# Patient Record
Sex: MALE | Race: WHITE | NOT HISPANIC OR LATINO | Employment: OTHER | URBAN - METROPOLITAN AREA
[De-identification: names, ages, dates, MRNs, and addresses within clinical notes are randomized per-mention and may not be internally consistent; named-entity substitution may affect disease eponyms.]

---

## 2021-04-14 DIAGNOSIS — Z23 ENCOUNTER FOR IMMUNIZATION: ICD-10-CM

## 2022-09-09 ENCOUNTER — HOSPITAL ENCOUNTER (EMERGENCY)
Facility: HOSPITAL | Age: 77
Discharge: HOME | End: 2022-09-09
Attending: EMERGENCY MEDICINE
Payer: MEDICARE

## 2022-09-09 VITALS
HEART RATE: 58 BPM | SYSTOLIC BLOOD PRESSURE: 160 MMHG | TEMPERATURE: 97.3 F | RESPIRATION RATE: 18 BRPM | DIASTOLIC BLOOD PRESSURE: 92 MMHG | OXYGEN SATURATION: 94 %

## 2022-09-09 DIAGNOSIS — Z01.89 ROUTINE LAB DRAW: Primary | ICD-10-CM

## 2022-09-09 RX ORDER — TRIAMCINOLONE ACETONIDE 1 MG/G
CREAM TOPICAL
COMMUNITY
Start: 2022-02-04

## 2022-09-09 RX ORDER — RIVAROXABAN 20 MG/1
TABLET, FILM COATED ORAL
COMMUNITY
Start: 2022-08-31

## 2022-09-09 RX ORDER — ATORVASTATIN CALCIUM 40 MG/1
40 TABLET, FILM COATED ORAL
COMMUNITY
Start: 2022-06-20

## 2022-09-09 RX ORDER — CELECOXIB 200 MG/1
CAPSULE ORAL
COMMUNITY
Start: 2022-09-06

## 2022-09-09 RX ORDER — SILDENAFIL 100 MG/1
1 TABLET, FILM COATED ORAL DAILY PRN
COMMUNITY
Start: 2022-06-20

## 2022-09-09 RX ORDER — BRIMONIDINE TARTRATE AND TIMOLOL MALEATE 2; 5 MG/ML; MG/ML
SOLUTION OPHTHALMIC
COMMUNITY
Start: 2022-08-18

## 2022-09-09 RX ORDER — METFORMIN HYDROCHLORIDE 1000 MG/1
1 TABLET ORAL 2 TIMES DAILY WITH MEALS
COMMUNITY
Start: 2022-08-08

## 2022-09-09 RX ORDER — ALLOPURINOL 300 MG/1
300 TABLET ORAL
COMMUNITY
Start: 2022-06-20

## 2022-09-09 RX ORDER — METOPROLOL SUCCINATE 50 MG/1
50 TABLET, EXTENDED RELEASE ORAL
COMMUNITY
Start: 2022-07-23

## 2022-09-09 RX ORDER — FINASTERIDE 1 MG/1
1 TABLET, FILM COATED ORAL
COMMUNITY
Start: 2022-06-02

## 2022-09-09 RX ORDER — OMEPRAZOLE 20 MG/1
20 CAPSULE, DELAYED RELEASE ORAL DAILY
COMMUNITY
Start: 2021-11-12

## 2022-09-10 NOTE — DISCHARGE INSTRUCTIONS
You were brought to the hospital for blood work and evidence collection. We offered you a medical exam but you would like to refuse. We feel you are competent to make this choice.

## 2022-09-10 NOTE — ED ATTESTATION NOTE
The patient was evaluated and managed by the PA/NP/resident.  I have discussed the case with the PA/NP/resident.  I am in agreement with the ED workup and treatment plan.  I will continue to be available for consultation as needed.     Godshall, Duane K, MD  09/09/22 2026

## 2023-05-17 ENCOUNTER — APPOINTMENT (EMERGENCY)
Dept: RADIOLOGY | Facility: HOSPITAL | Age: 78
End: 2023-05-17
Payer: MEDICARE

## 2023-05-17 ENCOUNTER — HOSPITAL ENCOUNTER (EMERGENCY)
Facility: HOSPITAL | Age: 78
Discharge: HOME | End: 2023-05-17
Attending: EMERGENCY MEDICINE
Payer: MEDICARE

## 2023-05-17 VITALS
TEMPERATURE: 97.2 F | HEIGHT: 72 IN | WEIGHT: 210 LBS | DIASTOLIC BLOOD PRESSURE: 83 MMHG | HEART RATE: 50 BPM | OXYGEN SATURATION: 95 % | BODY MASS INDEX: 28.44 KG/M2 | SYSTOLIC BLOOD PRESSURE: 111 MMHG | RESPIRATION RATE: 16 BRPM

## 2023-05-17 DIAGNOSIS — S09.90XA CLOSED HEAD INJURY, INITIAL ENCOUNTER: Primary | ICD-10-CM

## 2023-05-17 DIAGNOSIS — S96.912A MUSCLE STRAIN OF LEFT FOOT, INITIAL ENCOUNTER: ICD-10-CM

## 2023-05-17 PROCEDURE — 99284 EMERGENCY DEPT VISIT MOD MDM: CPT | Mod: 25

## 2023-05-17 PROCEDURE — 73630 X-RAY EXAM OF FOOT: CPT | Mod: LT

## 2023-05-17 PROCEDURE — 70450 CT HEAD/BRAIN W/O DYE: CPT | Mod: ME

## 2023-05-17 ASSESSMENT — ENCOUNTER SYMPTOMS
FACIAL ASYMMETRY: 0
LIGHT-HEADEDNESS: 0
ABDOMINAL PAIN: 0
HEADACHES: 1
NAUSEA: 0
NECK PAIN: 0
VOMITING: 0
NUMBNESS: 0
WEAKNESS: 0
DIZZINESS: 0
FEVER: 0
SPEECH DIFFICULTY: 0
BACK PAIN: 0

## 2023-05-17 NOTE — ED PROVIDER NOTES
Emergency Medicine Note  HPI   HISTORY OF PRESENT ILLNESS     77 year old male with a history of hypertension, hyperlpidemia, diabetes, atrial fibrillation on xarelto presents to the ER for evaluation after a fall. Pt tripped yesterday while leaving a store and fell forward striking his head. He has bruise to the right side of his head which is sore. He did not lose consciousness. He feels ok otherwise. He denies any dizziness, nausea, vomiting, ataxia, speech or vision issue. He feels his left foot is more flat than it normally is. He denies any neck, back, chest, abdominal, hip injury. His upper extremities are without pain             Patient History   PAST HISTORY     Reviewed from Nursing Triage:       Past Medical History:   Diagnosis Date   • Hypertension    • Lipid disorder    • Type 2 diabetes mellitus (CMS/HCC)        Past Surgical History:   Procedure Laterality Date   • CARDIAC SURGERY         History reviewed. No pertinent family history.    Social History     Tobacco Use   • Smoking status: Never   • Smokeless tobacco: Never   Vaping Use   • Vaping status: Never Used   Substance Use Topics   • Alcohol use: Yes   • Drug use: Never         Review of Systems   REVIEW OF SYSTEMS     Review of Systems   Constitutional: Negative for fever.   Cardiovascular: Negative for chest pain.   Gastrointestinal: Negative for abdominal pain, nausea and vomiting.   Musculoskeletal: Negative for back pain, gait problem and neck pain.   Neurological: Positive for headaches. Negative for dizziness, facial asymmetry, speech difficulty, weakness, light-headedness and numbness.         VITALS     ED Vitals    Date/Time Temp Pulse Resp BP SpO2 [facility]   05/17/23 1249 36.2 °C (97.2 °F) 50 16 111/83 95 % JBK        Pulse Ox %: 95 % (05/17/23 1414)  Pulse Ox Interpretation: Normal (05/17/23 1414)           Physical Exam   PHYSICAL EXAM     Physical Exam  Constitutional:       General: He is not in acute distress.     Appearance:  Normal appearance. He is normal weight. He is not ill-appearing, toxic-appearing or diaphoretic.   HENT:      Head: Normocephalic.      Comments: Contusion to right temple  Eyes:      Extraocular Movements: Extraocular movements intact.      Pupils: Pupils are equal, round, and reactive to light.   Pulmonary:      Effort: Pulmonary effort is normal.   Chest:      Chest wall: No tenderness.   Abdominal:      Palpations: Abdomen is soft.      Tenderness: There is no abdominal tenderness.   Musculoskeletal:      Cervical back: Normal range of motion and neck supple. No tenderness.      Comments: No C/T/L spine tenderness.  Full ROM Of all 4 extremities. No swelling or deformities.    Skin:     General: Skin is warm and dry.   Neurological:      General: No focal deficit present.      Mental Status: He is alert and oriented to person, place, and time.      Sensory: No sensory deficit.      Motor: No weakness.           PROCEDURES     Procedures     DATA     Results     None          Imaging Results          CT HEAD WITHOUT IV CONTRAST (Final result)  Result time 05/17/23 14:06:10    Final result                 Impression:    IMPRESSION:  1. No acute intracranial abnormality.             Narrative:    CLINICAL HISTORY: Trauma. Fall. Head strike on concrete.    COMMENT:  TECHNIQUE: CT of the head was performed without intravenous contrast. Sagittal  and coronal reformations were rendered.  CT DOSE:  One or more dose reduction techniques (e.g. automated exposure  control, adjustment of the mA and/or kV according to patient size, use of  iterative reconstruction technique) utilized for this examination.  COMPARISON: None.    Findings:  No acute intracranial hemorrhage. No extra-axial fluid collection, midline shift  or mass effect.  No acute transcortical infarction. Enlarged perivascular spaces versus chronic  lacunar infarct in the inferior right basal ganglia. Mineralization of the  bilateral globi pallidi. Probable  chronic small vessel ischemic changes of the  white matter. Intracranial atherosclerosis.  Cerebral volume loss with enlargement of the ventricles and sulci.    No focal osseous abnormality.  Visualized paranasal sinuses and mastoid air cells are essentially clear.                               X-RAY FOOT LEFT 3+ VIEWS (Final result)  Result time 05/17/23 13:58:00    Final result                 Impression:    IMPRESSION:  No fracture, see above for comments.             Narrative:      CLINICAL HISTORY: fall L foot pain  COMMENT:  4  view examination of the left foot shows no fracture or dislocation of bone  structures.  No other significant bone abnormality is noted.  The joint spaces  are intact with hallux valgus deformity joint space narrowing first MTP joint.  Bones are demineralized..                                No orders to display       Scoring tools                                  ED Course & MDM   MDM / ED COURSE / CLINICAL IMPRESSION / DISPO     MDM    ED Course as of 05/17/23 2052   Wed May 17, 2023   1414 CT head is unremarkable [[facility]]   1415 X-ray of the foot is negative for fracture [[facility]]      ED Course User Index  [[facility]] Bobby Reaves DO     Clinical Impression      Closed head injury, initial encounter   Muscle strain of left foot, initial encounter     _________________     ED Disposition   Discharge                   Sharon Richards PA C  05/17/23 2053

## 2023-06-19 ENCOUNTER — HOSPITAL ENCOUNTER (EMERGENCY)
Facility: HOSPITAL | Age: 78
Discharge: HOME | End: 2023-06-19
Attending: STUDENT IN AN ORGANIZED HEALTH CARE EDUCATION/TRAINING PROGRAM
Payer: MEDICARE

## 2023-06-19 ENCOUNTER — APPOINTMENT (EMERGENCY)
Dept: RADIOLOGY | Facility: HOSPITAL | Age: 78
End: 2023-06-19
Payer: MEDICARE

## 2023-06-19 VITALS
BODY MASS INDEX: 27.83 KG/M2 | OXYGEN SATURATION: 95 % | WEIGHT: 210 LBS | DIASTOLIC BLOOD PRESSURE: 63 MMHG | HEIGHT: 73 IN | TEMPERATURE: 98.8 F | RESPIRATION RATE: 19 BRPM | SYSTOLIC BLOOD PRESSURE: 101 MMHG | HEART RATE: 77 BPM

## 2023-06-19 DIAGNOSIS — M79.672 LEFT FOOT PAIN: Primary | ICD-10-CM

## 2023-06-19 DIAGNOSIS — M20.12 HALLUX VALGUS OF LEFT FOOT: ICD-10-CM

## 2023-06-19 PROCEDURE — 73630 X-RAY EXAM OF FOOT: CPT | Mod: LT

## 2023-06-19 PROCEDURE — 99283 EMERGENCY DEPT VISIT LOW MDM: CPT

## 2023-06-19 ASSESSMENT — ENCOUNTER SYMPTOMS
EXTREMITY NUMBNESS: 0
FEVER: 0
BACK PAIN: 0
MUSCLE WEAKNESS: 0
WOUND: 0
STIFFNESS: 0
CHILLS: 0
FATIGUE: 0

## 2023-06-19 NOTE — ED PROVIDER NOTES
Emergency Medicine Note  HPI   HISTORY OF PRESENT ILLNESS       History provided by:  Patient  Lower Extremity Issue  Location:  Foot  Time since incident:  1 month  Injury: yes    Mechanism of injury comment:  Fall a month ago with persistent left foot pain.  initially evaluated here had xrays no acute fx referred to podiatry.  pt has not followed up yet.  feels his foot looks flat and wants to know why  Foot location:  L foot  Pain details:     Quality:  Sharp    Radiates to:  Does not radiate    Severity:  Mild    Onset quality:  Sudden    Duration:  1 month    Timing:  Constant    Progression:  Unchanged  Chronicity:  New  Prior injury to area:  Yes  Relieved by:  Immobilization  Worsened by:  Bearing weight  Ineffective treatments:  None tried  Associated symptoms: no back pain, no decreased ROM, no fatigue, no fever, no muscle weakness, no numbness, no stiffness, no swelling and no tingling          Patient History   PAST HISTORY     Reviewed from Nursing Triage:       Past Medical History:   Diagnosis Date   • Hypertension    • Lipid disorder    • Type 2 diabetes mellitus (CMS/[facility])        Past Surgical History:   Procedure Laterality Date   • CARDIAC SURGERY         History reviewed. No pertinent family history.    Social History     Tobacco Use   • Smoking status: Never   • Smokeless tobacco: Never   Vaping Use   • Vaping Use: Never used   Substance Use Topics   • Alcohol use: Yes   • Drug use: Never         Review of Systems   REVIEW OF SYSTEMS     Review of Systems   Constitutional: Negative for chills, fatigue and fever.   Musculoskeletal: Negative for back pain and stiffness.   Skin: Negative for wound.         VITALS     ED Vitals    Date/Time Temp Pulse Resp BP SpO2 [facility]   06/19/23 1249 -- 77 19 -- 95 % TR   06/19/23 1117 37.1 °C (98.8 °F) -- -- 101/63 -- TR   06/19/23 1115 -- 66 20 -- 95 % TR        Pulse Ox %: 95 % (06/19/23 1119)  Pulse Ox Interpretation: Normal (06/19/23 1119)           Physical  Exam   PHYSICAL EXAM     Physical Exam  Vitals and nursing note reviewed.   Constitutional:       Appearance: Normal appearance. He is normal weight.   HENT:      Head: Normocephalic.   Cardiovascular:      Pulses:           Dorsalis pedis pulses are 2+ on the left side.        Posterior tibial pulses are 2+ on the left side.   Musculoskeletal:      Left ankle: Normal. No tenderness. Normal range of motion. Normal pulse.      Left Achilles Tendon: Normal. No tenderness or defects. Canales's test negative.      Left foot: Normal range of motion and normal capillary refill. Tenderness present. No swelling or prominent metatarsal heads. Normal pulse.        Legs:    Skin:     General: Skin is warm and dry.      Capillary Refill: Capillary refill takes less than 2 seconds.   Neurological:      Mental Status: He is alert and oriented to person, place, and time.   Psychiatric:         Mood and Affect: Mood normal.           PROCEDURES     Procedures     DATA     Results     None          Imaging Results          X-RAY FOOT LEFT 3+ VIEWS (Final result)  Result time 06/19/23 12:26:13    Final result                 Impression:    IMPRESSION:    No fracture or dislocation of the LEFT foot.  Hallux valgus deformity and degenerative changes of the first metatarsal  phalangeal joint.             Narrative:    CLINICAL HISTORY: Left foot pain    COMMENT: Four views of LEFT foot are submitted for review.    Comparison: Prior exam 5/17/2023.    There is normal osseous mineralization.  No focal lytic or sclerotic bone  lesion. Hallux valgus deformity at the first metatarsophalangeal joint with  joint space narrowing and marginal osteophyte formation. Remainder of the joint  spaces are maintained.  No evidence for periarticular erosion. Normal alignment  bolus rocio joint. Small calcification adjacent to the medial malleolus.                                No orders to display       Scoring tools                                  ED  Course & MDM   MDM / ED COURSE / CLINICAL IMPRESSION / DISPO     Medical Decision Making  Hallux valgus of left foot: acute illness or injury  Left foot pain: acute illness or injury  Amount and/or Complexity of Data Reviewed  External Data Reviewed: radiology and notes.  Radiology: ordered. Decision-making details documented in ED Course.          ED Course as of 06/19/23 1416 Mon Jun 19, 2023   1234 X-RAY FOOT LEFT 3+ VIEWS  IMPRESSION: No fracture or dislocation of the LEFT foot. Hallux valgus deformity and degenerative changes of the first metatarsal phalangeal joint. [NS]      ED Course User Index  [NS] Carlos Marte, DO     Clinical Impression      Left foot pain   Hallux valgus of left foot     _________________     ED Disposition   Discharge                   Hua Moreno PA C  06/19/23 1416

## 2023-06-19 NOTE — ED ATTESTATION NOTE
"I have personally seen and examined Alex Frazier.  I was involved in the care, management and medical decision making for this patient.  I reviewed and agree with physician assistant (PA-C) assessment and plan of care; we discussed the case and the treatment plan. Any exceptions are documented below.    My focused history, examination, assessment and plan of care of Alex Frazier is as follows:  Brief History:  77M concerned with left foot flat arch and painful with ambulation. Did have fall one month ago; no trauma more recent.          has a past medical history of Hypertension, Lipid disorder, and Type 2 diabetes mellitus (CMS/[facility]).   has a past surgical history that includes Cardiac surgery.  Focused Physical Exam:  Patient Vitals for the past 72 hrs:   BP Temp Temp src Pulse Resp SpO2 Height Weight   06/19/23 1249 -- -- -- 77 19 95 % -- --   06/19/23 1117 101/63 37.1 °C (98.8 °F) Oral -- -- -- -- --   06/19/23 1115 -- -- -- 66 20 95 % 1.854 m (6' 1\") 95.3 kg (210 lb)     Physical Exam  Vitals and nursing note reviewed.   Constitutional:       General: He is not in acute distress.     Appearance: Normal appearance. He is normal weight. He is not ill-appearing or toxic-appearing.   HENT:      Head: Normocephalic and atraumatic.      Right Ear: External ear normal.      Left Ear: External ear normal.      Nose: Nose normal.      Mouth/Throat:      Mouth: Mucous membranes are moist.      Pharynx: Oropharynx is clear.   Eyes:      Extraocular Movements: Extraocular movements intact.      Conjunctiva/sclera: Conjunctivae normal.   Pulmonary:      Effort: Pulmonary effort is normal. No respiratory distress.   Musculoskeletal:         General: Normal range of motion.      Cervical back: Normal range of motion. No rigidity.      Comments: Left foot does appear to have more of flat insole compared to right but appears chronic in nature; no gross traumatic findings. Normal skin tone. Stable gait.    Skin:     " General: Skin is dry.      Coloration: Skin is not pale.   Neurological:      General: No focal deficit present.      Mental Status: He is alert. Mental status is at baseline.   Psychiatric:         Mood and Affect: Mood normal.         Behavior: Behavior normal.       Assessment / Plan / MDM:  Medical Decision Making  77M with left foot pain; fall one month ago. Concern for flat arch.   Ddx: foot strain, sprain, fracture, dislocation, arthritis, plantar fascitis, flat footed.   Plan: xray left foot, post op shoe, f/u with foot doctor.     Amount and/or Complexity of Data Reviewed  Radiology: ordered. Decision-making details documented in ED Course.        I was physically present for the key/critical portions of the following procedures:  Procedures            Carlos Marte DO  06/19/23 1217

## 2023-07-12 ENCOUNTER — HOSPITAL ENCOUNTER (EMERGENCY)
Facility: HOSPITAL | Age: 78
End: 2023-07-12
Payer: MEDICARE